# Patient Record
Sex: MALE | Race: WHITE | NOT HISPANIC OR LATINO | Employment: FULL TIME | URBAN - METROPOLITAN AREA
[De-identification: names, ages, dates, MRNs, and addresses within clinical notes are randomized per-mention and may not be internally consistent; named-entity substitution may affect disease eponyms.]

---

## 2021-07-08 ENCOUNTER — OFFICE VISIT (OUTPATIENT)
Dept: ORTHOPEDIC SURGERY | Facility: CLINIC | Age: 21
End: 2021-07-08

## 2021-07-08 VITALS
HEIGHT: 70 IN | HEART RATE: 59 BPM | WEIGHT: 224 LBS | DIASTOLIC BLOOD PRESSURE: 89 MMHG | SYSTOLIC BLOOD PRESSURE: 137 MMHG | BODY MASS INDEX: 32.07 KG/M2

## 2021-07-08 DIAGNOSIS — M25.311 INSTABILITY OF RIGHT SHOULDER JOINT: Primary | ICD-10-CM

## 2021-07-08 DIAGNOSIS — S43.491A BANKART LESION OF RIGHT SHOULDER, INITIAL ENCOUNTER: ICD-10-CM

## 2021-07-08 PROBLEM — S43.431A BANKART LESION OF RIGHT SHOULDER: Status: ACTIVE | Noted: 2021-07-08

## 2021-07-08 PROCEDURE — 99204 OFFICE O/P NEW MOD 45 MIN: CPT | Performed by: ORTHOPAEDIC SURGERY
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
## 2021-07-28 ENCOUNTER — TELEPHONE (OUTPATIENT)
Dept: ORTHOPEDIC SURGERY | Facility: CLINIC | Age: 21
End: 2021-07-28

## 2021-07-28 NOTE — TELEPHONE ENCOUNTER
I spoke with the patient and he would like letter faxed.  (number below) with restrictions.      Restrictions of no pushing, pulling, lifting or any overhead work at this time.  We will re-evaluate at future appointment.        Can you please type and fax letter.    Thanks,  Ragini Alston

## 2021-07-28 NOTE — TELEPHONE ENCOUNTER
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 PATIENT ADVISED HE NEEDS WORK RESTRICTIONS FAXED ASAP.

## 2021-07-28 NOTE — TELEPHONE ENCOUNTER
From Dr Wilcox:    Please check with the patient.  He is currently on full duty.  Does he feel that he could participate with the lifting pushing pulling necessary for his training.     If he does not feel that he is ready to do that we can keep him out of his training until he is cleared we would allow running jumping dynamic things but no lift push pull more than what ever weight he feels uncomfortable with.  Please check with the patient.         I called patient but no answer and no voicemail.    Ragini Alston

## 2022-10-08 ENCOUNTER — APPOINTMENT (OUTPATIENT)
Dept: GENERAL RADIOLOGY | Facility: HOSPITAL | Age: 22
End: 2022-10-08

## 2022-10-08 ENCOUNTER — HOSPITAL ENCOUNTER (EMERGENCY)
Facility: HOSPITAL | Age: 22
Discharge: HOME | End: 2022-10-08
Attending: EMERGENCY MEDICINE | Admitting: EMERGENCY MEDICINE

## 2022-10-08 VITALS
HEIGHT: 70 IN | OXYGEN SATURATION: 98 % | RESPIRATION RATE: 16 BRPM | WEIGHT: 230 LBS | DIASTOLIC BLOOD PRESSURE: 79 MMHG | HEART RATE: 76 BPM | TEMPERATURE: 98.3 F | SYSTOLIC BLOOD PRESSURE: 139 MMHG | BODY MASS INDEX: 32.93 KG/M2

## 2022-10-08 DIAGNOSIS — S90.31XA CONTUSION OF RIGHT FOOT, INITIAL ENCOUNTER: Primary | ICD-10-CM

## 2022-10-08 PROCEDURE — 73630 X-RAY EXAM OF FOOT: CPT

## 2022-10-08 PROCEDURE — 99282 EMERGENCY DEPT VISIT SF MDM: CPT

## 2022-10-09 NOTE — DISCHARGE INSTRUCTIONS
There was no obvious fracture on x-ray read, radiologist will look at this x-ray in the morning.  If there are any other findings, you will be contacted.  Rest, ice and elevate to help with pain and swelling.  Alternate ibuprofen and Tylenol that with pain.  Use walking boot and crutches to help rest the foot.  Follow-up with orthopedic surgery if pain persists or worsens.  Return to ER for any change, worsening symptoms, or any additional concerns.

## 2022-10-09 NOTE — ED PROVIDER NOTES
Subjective   History of Present Illness  Patient is a 22-year-old male with no reported past medical history presenting to the ER for evaluation of foot injury.  Patient states earlier this afternoon he was helping his father-in-law move some concrete patio furniture.  He states they are using a bobcat and it accidentally came down with the heavy load on top of his right foot.  He states he was wearing shoes at that time but has had some pain, bruising and swelling since the incident.  He states it was initially throbbing and he took some Advil.  He states he was resting but when he got up to put weight on the injury he had significant pain.  He states the pain is only present when he does bear weight to the foot.  He denies any other injury or symptoms.        Review of Systems   Constitutional: Negative for chills and fever.   HENT: Negative.    Eyes: Negative.    Respiratory: Negative.    Cardiovascular: Negative.    Gastrointestinal: Negative.    Endocrine: Negative.    Genitourinary: Negative.    Musculoskeletal: Positive for arthralgias and myalgias.   Skin: Positive for color change.   Allergic/Immunologic: Negative for immunocompromised state.   Neurological: Negative.    Psychiatric/Behavioral: Negative.        History reviewed. No pertinent past medical history.    No Known Allergies    Past Surgical History:   Procedure Laterality Date   • KNEE SURGERY Right     scope       Family History   Problem Relation Age of Onset   • No Known Problems Mother        Social History     Socioeconomic History   • Marital status: Single   Tobacco Use   • Smoking status: Never   • Smokeless tobacco: Never   Substance and Sexual Activity   • Alcohol use: Yes     Comment: occasional    • Drug use: Never   • Sexual activity: Defer           Objective   Physical Exam  Vitals and nursing note reviewed.     /79 (BP Location: Left arm, Patient Position: Sitting)   Pulse 76   Temp 98.3 °F (36.8 °C) (Oral)   Resp 16    "Ht 177.8 cm (70\")   Wt 104 kg (230 lb)   SpO2 98%   BMI 33.00 kg/m²     GEN: No acute distress, sitting upright in stretcher.  Awake and alert.  Does not appear septic or toxic.  Skin: Patient has a large area of ecchymosis to the dorsal aspect of right foot just distal to the ankle.  Head: Normocephalic, atraumatic  Eyes: EOM intact  ENT: Mask in place per  Cardiovascular: Regular rate  Lungs: Breathing even and nonlabored  Extremities: Patient has obvious ecchymosis, edema and tenderness to the dorsal aspect of right foot just distal to the ankle.  There is no tenderness over the medial or lateral malleolus.  Dorsalis pedis pulse is 2+  Neuro: GCS 15  Psych: Mood and affect are appropriate    Procedures           ED Course  ED Course as of 10/09/22 0032   Sat Oct 08, 2022   2045 Patient declined pain medicine at this time. [LA]   2138 Reviewed x-ray with Dr. Mejia, did not see any bony abnormality. [LA]   2138 Discussed findings with patient.  Discussed follow-up and strict return precautions.  He verbalized understanding and was in agreement with this plan of care [LA]      ED Course User Index  [LA] Sosa Garcia PA-C                                           MDM  Number of Diagnoses or Management Options  Contusion of right foot, initial encounter  Diagnosis management comments: On arrival, patient stable.  Differential could include crush injury, contusion, hematoma, fracture, and other concerns.  Patient declines medicine at this time.  Will obtain x-ray for further evaluation.    X-ray reviewed with attending, did not see any bony abnormality.  Will give patient walking boot and crutches.  Discussed follow-up, symptomatic treatment, strict return precautions.  He verbalized understanding and was in agreement with this plan of care       Amount and/or Complexity of Data Reviewed  Tests in the radiology section of CPT®: ordered and reviewed  Discussion of test results with the performing providers: " yes  Review and summarize past medical records: yes  Discuss the patient with other providers: yes    Risk of Complications, Morbidity, and/or Mortality  Presenting problems: low  Diagnostic procedures: low  Management options: low    Patient Progress  Patient progress: stable      Final diagnoses:   Contusion of right foot, initial encounter       ED Disposition  ED Disposition     ED Disposition   Discharge    Condition   Stable    Comment   --             Inderjit Steh MD  [address]  [facility] [address]  [facility] [address]  [phone]    Schedule an appointment as soon as possible for a visit   As needed, If symptoms worsen         Medication List      No changes were made to your prescriptions during this visit.          Sosa Garcia PA-C  10/09/22 0032

## 2024-02-20 ENCOUNTER — APPOINTMENT (OUTPATIENT)
Dept: CT IMAGING | Facility: HOSPITAL | Age: 24
End: 2024-02-20
Payer: COMMERCIAL

## 2024-02-20 ENCOUNTER — HOSPITAL ENCOUNTER (EMERGENCY)
Facility: HOSPITAL | Age: 24
Discharge: HOME | End: 2024-02-20
Attending: STUDENT IN AN ORGANIZED HEALTH CARE EDUCATION/TRAINING PROGRAM | Admitting: STUDENT IN AN ORGANIZED HEALTH CARE EDUCATION/TRAINING PROGRAM
Payer: COMMERCIAL

## 2024-02-20 VITALS
SYSTOLIC BLOOD PRESSURE: 123 MMHG | DIASTOLIC BLOOD PRESSURE: 83 MMHG | HEART RATE: 63 BPM | HEIGHT: 70 IN | WEIGHT: 220 LBS | BODY MASS INDEX: 31.5 KG/M2 | TEMPERATURE: 97.8 F | OXYGEN SATURATION: 100 % | RESPIRATION RATE: 14 BRPM

## 2024-02-20 DIAGNOSIS — N23 RENAL COLIC ON RIGHT SIDE: ICD-10-CM

## 2024-02-20 DIAGNOSIS — N20.0 KIDNEY STONE ON RIGHT SIDE: Primary | ICD-10-CM

## 2024-02-20 LAB
ALBUMIN SERPL-MCNC: 4.8 G/DL (ref 3.5–5.2)
ALBUMIN/GLOB SERPL: 1.7 G/DL
ALP SERPL-CCNC: 65 U/L (ref 39–117)
ALT SERPL W P-5'-P-CCNC: 103 U/L (ref 1–41)
ANION GAP SERPL CALCULATED.3IONS-SCNC: 15.9 MMOL/L (ref 5–15)
AST SERPL-CCNC: 63 U/L (ref 1–40)
BACTERIA UR QL AUTO: ABNORMAL /HPF
BILIRUB SERPL-MCNC: 0.6 MG/DL (ref 0–1.2)
BILIRUB UR QL STRIP: NEGATIVE
BUN SERPL-MCNC: 13 MG/DL (ref 6–20)
BUN/CREAT SERPL: 9.3 (ref 7–25)
CALCIUM SPEC-SCNC: 9.4 MG/DL (ref 8.6–10.5)
CHLORIDE SERPL-SCNC: 100 MMOL/L (ref 98–107)
CLARITY UR: CLEAR
CO2 SERPL-SCNC: 23.1 MMOL/L (ref 22–29)
COLOR UR: YELLOW
CREAT SERPL-MCNC: 1.4 MG/DL (ref 0.76–1.27)
DEPRECATED RDW RBC AUTO: 38.5 FL (ref 37–54)
EGFRCR SERPLBLD CKD-EPI 2021: 71.5 ML/MIN/1.73
EOSINOPHIL # BLD MANUAL: 0.43 10*3/MM3 (ref 0–0.4)
EOSINOPHIL NFR BLD MANUAL: 4 % (ref 0.3–6.2)
ERYTHROCYTE [DISTWIDTH] IN BLOOD BY AUTOMATED COUNT: 12.7 % (ref 12.3–15.4)
GLOBULIN UR ELPH-MCNC: 2.8 GM/DL
GLUCOSE SERPL-MCNC: 127 MG/DL (ref 65–99)
GLUCOSE UR STRIP-MCNC: NEGATIVE MG/DL
HCT VFR BLD AUTO: 44.7 % (ref 37.5–51)
HGB BLD-MCNC: 15.3 G/DL (ref 13–17.7)
HGB UR QL STRIP.AUTO: ABNORMAL
HYALINE CASTS UR QL AUTO: ABNORMAL /LPF
KETONES UR QL STRIP: ABNORMAL
LEUKOCYTE ESTERASE UR QL STRIP.AUTO: NEGATIVE
LIPASE SERPL-CCNC: 17 U/L (ref 13–60)
LYMPHOCYTES # BLD MANUAL: 4.82 10*3/MM3 (ref 0.7–3.1)
LYMPHOCYTES NFR BLD MANUAL: 9 % (ref 5–12)
MCH RBC QN AUTO: 28.9 PG (ref 26.6–33)
MCHC RBC AUTO-ENTMCNC: 34.2 G/DL (ref 31.5–35.7)
MCV RBC AUTO: 84.5 FL (ref 79–97)
MONOCYTES # BLD: 0.96 10*3/MM3 (ref 0.1–0.9)
NEUTROPHILS # BLD AUTO: 4.5 10*3/MM3 (ref 1.7–7)
NEUTROPHILS NFR BLD MANUAL: 42 % (ref 42.7–76)
NITRITE UR QL STRIP: NEGATIVE
PH UR STRIP.AUTO: 6.5 [PH] (ref 5–8)
PLATELET # BLD AUTO: 142 10*3/MM3 (ref 140–450)
PMV BLD AUTO: 12.7 FL (ref 6–12)
POTASSIUM SERPL-SCNC: 3.5 MMOL/L (ref 3.5–5.2)
PROT SERPL-MCNC: 7.6 G/DL (ref 6–8.5)
PROT UR QL STRIP: NEGATIVE
RBC # BLD AUTO: 5.29 10*6/MM3 (ref 4.14–5.8)
RBC # UR STRIP: ABNORMAL /HPF
RBC MORPH BLD: NORMAL
REF LAB TEST METHOD: ABNORMAL
SCAN SLIDE: NORMAL
SMALL PLATELETS BLD QL SMEAR: ADEQUATE
SODIUM SERPL-SCNC: 139 MMOL/L (ref 136–145)
SP GR UR STRIP: >1.03 (ref 1–1.03)
SQUAMOUS #/AREA URNS HPF: ABNORMAL /HPF
UROBILINOGEN UR QL STRIP: ABNORMAL
VARIANT LYMPHS NFR BLD MANUAL: 20 % (ref 0–5)
VARIANT LYMPHS NFR BLD MANUAL: 25 % (ref 19.6–45.3)
WBC # UR STRIP: ABNORMAL /HPF
WBC MORPH BLD: NORMAL
WBC NRBC COR # BLD AUTO: 10.71 10*3/MM3 (ref 3.4–10.8)

## 2024-02-20 PROCEDURE — 25510000001 IOPAMIDOL 61 % SOLUTION: Performed by: STUDENT IN AN ORGANIZED HEALTH CARE EDUCATION/TRAINING PROGRAM

## 2024-02-20 PROCEDURE — 99285 EMERGENCY DEPT VISIT HI MDM: CPT

## 2024-02-20 PROCEDURE — 83690 ASSAY OF LIPASE: CPT | Performed by: STUDENT IN AN ORGANIZED HEALTH CARE EDUCATION/TRAINING PROGRAM

## 2024-02-20 PROCEDURE — 25010000002 KETOROLAC TROMETHAMINE PER 15 MG: Performed by: STUDENT IN AN ORGANIZED HEALTH CARE EDUCATION/TRAINING PROGRAM

## 2024-02-20 PROCEDURE — 25810000003 SODIUM CHLORIDE 0.9 % SOLUTION: Performed by: STUDENT IN AN ORGANIZED HEALTH CARE EDUCATION/TRAINING PROGRAM

## 2024-02-20 PROCEDURE — 85025 COMPLETE CBC W/AUTO DIFF WBC: CPT | Performed by: STUDENT IN AN ORGANIZED HEALTH CARE EDUCATION/TRAINING PROGRAM

## 2024-02-20 PROCEDURE — 80053 COMPREHEN METABOLIC PANEL: CPT | Performed by: STUDENT IN AN ORGANIZED HEALTH CARE EDUCATION/TRAINING PROGRAM

## 2024-02-20 PROCEDURE — 81001 URINALYSIS AUTO W/SCOPE: CPT | Performed by: STUDENT IN AN ORGANIZED HEALTH CARE EDUCATION/TRAINING PROGRAM

## 2024-02-20 PROCEDURE — 74177 CT ABD & PELVIS W/CONTRAST: CPT

## 2024-02-20 PROCEDURE — 85007 BL SMEAR W/DIFF WBC COUNT: CPT | Performed by: STUDENT IN AN ORGANIZED HEALTH CARE EDUCATION/TRAINING PROGRAM

## 2024-02-20 PROCEDURE — 96374 THER/PROPH/DIAG INJ IV PUSH: CPT

## 2024-02-20 RX ORDER — KETOROLAC TROMETHAMINE 30 MG/ML
30 INJECTION, SOLUTION INTRAMUSCULAR; INTRAVENOUS ONCE
Status: COMPLETED | OUTPATIENT
Start: 2024-02-20 | End: 2024-02-20

## 2024-02-20 RX ORDER — TAMSULOSIN HYDROCHLORIDE 0.4 MG/1
1 CAPSULE ORAL DAILY
Qty: 5 CAPSULE | Refills: 0 | Status: SHIPPED | OUTPATIENT
Start: 2024-02-20

## 2024-02-20 RX ORDER — SODIUM CHLORIDE 0.9 % (FLUSH) 0.9 %
10 SYRINGE (ML) INJECTION AS NEEDED
Status: DISCONTINUED | OUTPATIENT
Start: 2024-02-20 | End: 2024-02-20 | Stop reason: HOSPADM

## 2024-02-20 RX ORDER — HYDROCODONE BITARTRATE AND ACETAMINOPHEN 5; 325 MG/1; MG/1
1 TABLET ORAL EVERY 6 HOURS PRN
Qty: 6 TABLET | Refills: 0 | Status: SHIPPED | OUTPATIENT
Start: 2024-02-20

## 2024-02-20 RX ADMIN — SODIUM CHLORIDE 1000 ML: 9 INJECTION, SOLUTION INTRAVENOUS at 00:41

## 2024-02-20 RX ADMIN — IOPAMIDOL 100 ML: 612 INJECTION, SOLUTION INTRAVENOUS at 01:03

## 2024-02-20 RX ADMIN — KETOROLAC TROMETHAMINE 30 MG: 30 INJECTION, SOLUTION INTRAMUSCULAR; INTRAVENOUS at 00:33

## 2024-02-20 NOTE — ED NOTES
No needs voiced at this time, pt sitting in bed speaking to wife, resp e/u, no s/s of distress, no needs voiced at this time.

## 2024-02-20 NOTE — ED NOTES
"Pt pulling off blood pressure cuff, SPO2 sensor and throwing them in to floor and hollering, \" someone please help me!\"   "

## 2024-02-20 NOTE — DISCHARGE INSTRUCTIONS
Continue Tylenol Motrin as needed for pain.  For pain that does not respond to these medications may take Norco as prescribed.  Follow-up with urology if stone does not pass on its own within the next 2 to 3 days.  Contact number provided to arrange appointment.

## 2024-02-20 NOTE — ED NOTES
"Went to vehicle due to family member coming up to window and stating pt was hurting too bad and needed help. At this time I acquired a wheelchair and proceeded to lobby as I entered lobby doors, the spouse screamed, \"put some fucking pep in your step! 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  "

## 2024-02-20 NOTE — Clinical Note
[facility] EMERGENCY DEPARTMENT  [address] [address]  Phone: [phone]    Umer Friedman was seen and treated in our emergency department on 2/20/2024.  He may return to work on 02/21/2024.         Thank you for choosing [facility].    Jorge Aguilar MD

## 2024-02-20 NOTE — Clinical Note
[facility] EMERGENCY DEPARTMENT  [address] [address]  Phone: [phone]    Myra Friedman accompanied Umer Friedman to the emergency department on 2/20/2024. They may return to work on 02/21/2024.        Thank you for choosing [facility].    Jorge Aguilar MD

## 2024-02-20 NOTE — ED PROVIDER NOTES
EMERGENCY DEPARTMENT ENCOUNTER    Pt Name: Umer Friedman  MRN: [id number]  Pt :   2000  Room Number:    Date of encounter:  2024  PCP: Karoline Zamudio  ED Provider: Jorge Aguilar MD    Historian: Patient      HPI:  Chief Complaint: Abdominal pain        Context: Umer Friedman is a 23 y.o. male who presents to the ED c/o abdominal pain.  Patient states that he woke up from sleep with sudden onset of severe need to urinate but inability to urinate.  Also reports right flank pain and right lower abdominal pain.  Went to bed feeling normal and had urinated prior to going to bed.  Does report that he was having sex with his wife tonight and felt what he thought was a cramp in his right side at that time, but no significant pain.      PAST MEDICAL HISTORY  History reviewed. No pertinent past medical history.      PAST SURGICAL HISTORY  Past Surgical History:   Procedure Laterality Date    KNEE SURGERY Right     scope         FAMILY HISTORY  Family History   Problem Relation Age of Onset    No Known Problems Mother          SOCIAL HISTORY  Social History     Socioeconomic History    Marital status: Single   Tobacco Use    Smoking status: Never    Smokeless tobacco: Never   Substance and Sexual Activity    Alcohol use: Yes     Comment: occasional     Drug use: Never    Sexual activity: Defer         ALLERGIES  Patient has no known allergies.        REVIEW OF SYSTEMS  Review of Systems     All systems reviewed and negative except for those discussed in HPI.       PHYSICAL EXAM    I have reviewed the triage vital signs and nursing notes.    ED Triage Vitals [24 0020]   Temp Heart Rate Resp BP SpO2   97.6 °F (36.4 °C) 55 20 -- 99 %      Temp src Heart Rate Source Patient Position BP Location FiO2 (%)   Oral Monitor Lying Left arm --       Physical Exam    General:  Awake, alert, no acute distress  HEENT: Atraumatic, normocephalic, EOMI, PERRLA, mucous membranes moist  NECK:  Supple,  atraumatic  Cardiovascular:  Regular rate, regular rhythm, no murmurs, rubs, or gallops.  Extremities well perfused   Respiratory:  Regular rate, clear lungs to auscultation bilaterally.  No rhonchi, rales, wheezing  Abdominal:  Soft, nondistended, mild right lower quadrant tenderness to palpation, positive right CVA tenderness.  No guarding or rebound.  No palpable masses  Genitourinary: Normal external genitalia inspection with no blood at urethral meatus no visible or palpable hernias bilaterally..   Extremity:  No visible bony abnormalities in all 4 extremities.  Full range of motion of all extremities.  Skin:  Warm and dry.  No rashes  Neuro:  AAOx3, GCS 15. Cranial nerves 2-12 grossly intact.  No focal strength or sensation deficits.  Psych:  Mood and affect appropriate.        LAB RESULTS  Recent Results (from the past 24 hour(s))   Lipase    Collection Time: 02/20/24 12:30 AM    Specimen: Blood   Result Value Ref Range    Lipase 17 13 - 60 U/L   Comprehensive Metabolic Panel    Collection Time: 02/20/24 12:30 AM    Specimen: Blood   Result Value Ref Range    Glucose 127 (H) 65 - 99 mg/dL    BUN 13 6 - 20 mg/dL    Creatinine 1.40 (H) 0.76 - 1.27 mg/dL    Sodium 139 136 - 145 mmol/L    Potassium 3.5 3.5 - 5.2 mmol/L    Chloride 100 98 - 107 mmol/L    CO2 23.1 22.0 - 29.0 mmol/L    Calcium 9.4 8.6 - 10.5 mg/dL    Total Protein 7.6 6.0 - 8.5 g/dL    Albumin 4.8 3.5 - 5.2 g/dL    ALT (SGPT) 103 (H) 1 - 41 U/L    AST (SGOT) 63 (H) 1 - 40 U/L    Alkaline Phosphatase 65 39 - 117 U/L    Total Bilirubin 0.6 0.0 - 1.2 mg/dL    Globulin 2.8 gm/dL    A/G Ratio 1.7 g/dL    BUN/Creatinine Ratio 9.3 7.0 - 25.0    Anion Gap 15.9 (H) 5.0 - 15.0 mmol/L    eGFR 71.5 >60.0 mL/min/1.73   CBC Auto Differential    Collection Time: 02/20/24 12:30 AM    Specimen: Blood   Result Value Ref Range    WBC 10.71 3.40 - 10.80 10*3/mm3    RBC 5.29 4.14 - 5.80 10*6/mm3    Hemoglobin 15.3 13.0 - 17.7 g/dL    Hematocrit 44.7 37.5 - 51.0 %     MCV 84.5 79.0 - 97.0 fL    MCH 28.9 26.6 - 33.0 pg    MCHC 34.2 31.5 - 35.7 g/dL    RDW 12.7 12.3 - 15.4 %    RDW-SD 38.5 37.0 - 54.0 fl    MPV 12.7 (H) 6.0 - 12.0 fL    Platelets 142 140 - 450 10*3/mm3   Scan Slide    Collection Time: 02/20/24 12:30 AM    Specimen: Blood   Result Value Ref Range    Scan Slide     Manual Differential    Collection Time: 02/20/24 12:30 AM    Specimen: Blood   Result Value Ref Range    Neutrophil % 42.0 (L) 42.7 - 76.0 %    Lymphocyte % 25.0 19.6 - 45.3 %    Monocyte % 9.0 5.0 - 12.0 %    Eosinophil % 4.0 0.3 - 6.2 %    Atypical Lymphocyte % 20.0 (H) 0.0 - 5.0 %    Neutrophils Absolute 4.50 1.70 - 7.00 10*3/mm3    Lymphocytes Absolute 4.82 (H) 0.70 - 3.10 10*3/mm3    Monocytes Absolute 0.96 (H) 0.10 - 0.90 10*3/mm3    Eosinophils Absolute 0.43 (H) 0.00 - 0.40 10*3/mm3    RBC Morphology Normal Normal    WBC Morphology Normal Normal    Platelet Estimate Adequate Normal   Urinalysis With Microscopic If Indicated (No Culture) - Urine, Clean Catch    Collection Time: 02/20/24  1:21 AM    Specimen: Urine, Clean Catch   Result Value Ref Range    Color, UA Yellow Yellow, Straw    Appearance, UA Clear Clear    pH, UA 6.5 5.0 - 8.0    Specific Gravity, UA >1.030 (H) 1.005 - 1.030    Glucose, UA Negative Negative    Ketones, UA 15 mg/dL (1+) (A) Negative    Bilirubin, UA Negative Negative    Blood, UA Moderate (2+) (A) Negative    Protein, UA Negative Negative    Leuk Esterase, UA Negative Negative    Nitrite, UA Negative Negative    Urobilinogen, UA 0.2 E.U./dL 0.2 - 1.0 E.U./dL   Urinalysis, Microscopic Only - Urine, Clean Catch    Collection Time: 02/20/24  1:21 AM    Specimen: Urine, Clean Catch   Result Value Ref Range    RBC, UA 11-20 (A) None Seen, 0-2 /HPF    WBC, UA None Seen None Seen, 0-2 /HPF    Bacteria, UA None Seen None Seen /HPF    Squamous Epithelial Cells, UA 0-2 None Seen, 0-2 /HPF    Hyaline Casts, UA None Seen None Seen /LPF    Methodology Manual Light Microscopy        If  labs were ordered, I independently reviewed the results and considered them in treating the patient.        RADIOLOGY  CT Abdomen Pelvis With Contrast    Result Date: 2/20/2024  FINAL REPORT TECHNIQUE: null CLINICAL HISTORY: Right-sided abdominal pain COMPARISON: null FINDINGS: CT Abdomen and Pelvis W Contrast COMPARISON: None FINDINGS: Diffusely hypodense liver consistent with hepatic steatosis. Hepatomegaly. Normal spleen. Distal right ureteral 2 mm calculus at the right ureterovesicular junction (series 2, image 85). Mild right hydronephrosis and right ureterectasis. Unremarkable left kidney. Normal adrenal glands. Normal pancreas. No visible gallstones. No biliary dilation. No evidence of bowel obstruction or colitis. Normal appendix. Unremarkable bladder. No ascites. No pneumoperitoneum. No lymphadenopathy. No acute fracture. No abdominal aortic aneurysm.     IMPRESSION: Distal right ureteral calculus. Mild right hydronephrosis and right ureterectasis. Nonemergent/incidental findings above. Authenticated and Electronically Signed by Isaiah Fontanez MD on 02/20/2024 02:41:38 AM     I ordered and independently reviewed the above noted radiographic studies.     See radiologist's dictation for official interpretation.        PROCEDURES    Procedures    No orders to display       MEDICATIONS GIVEN IN ER    Medications   sodium chloride 0.9 % flush 10 mL (has no administration in time range)   ketorolac (TORADOL) injection 30 mg (30 mg Intravenous Given 2/20/24 0033)   sodium chloride 0.9 % bolus 1,000 mL (0 mL Intravenous Stopped 2/20/24 0220)   iopamidol (ISOVUE-300) 61 % injection 100 mL (100 mL Intravenous Given 2/20/24 0103)         MEDICAL DECISION MAKING, PROGRESS, and CONSULTS    All labs, if obtained, have been independently reviewed by me.  All radiology studies, if obtained, have been reviewed by me and the radiologist dictating the report.  All EKG's, if obtained, have been independently viewed and  interpreted by me.      Discussion below represents my analysis of pertinent findings related to patient's condition, differential diagnosis, treatment plan and final disposition.    Umer Friedman is a 23 y.o. male who presents to the ED c/o abdominal pain.  Hemodynamically stable but in visible discomfort on arrival.  Differential includes was not limited to acute urinary retention, kidney stone, appendicitis, UTI, pyelonephritis, urethral injury.  Patient given Toradol for pain IV 30 mg.  Labs and urinalysis ordered.  Bladder scan obtained by nursing that showed  Labs show borderline leukocytosis.  Mildly elevated creatinine of 1.4.  Urinalysis shows blood but no secondary evidence of infection.    CT of abdomen pelvis obtained which showed evidence of 2 mm distal right ureter calculus with mild right-sided hydro.  Pain well-controlled on reexamination.  Provided with short course prescription for hydrocodone along with urology referral if stone does not pass on its own within the next 2 to 3 days.  Patient agreeable with this plan and was discharged home with his wife.                                 Orders placed during this visit:  Orders Placed This Encounter   Procedures    CT Abdomen Pelvis With Contrast    Urinalysis With Microscopic If Indicated (No Culture) - Urine, Clean Catch    Lipase    Comprehensive Metabolic Panel    CBC Auto Differential    Scan Slide    Manual Differential    Urinalysis, Microscopic Only - Urine, Clean Catch    Insert Peripheral IV    CBC & Differential         ED Course:    Consultants:                  Shared Decision Making:  After my consideration of clinical presentation and any laboratory/radiology studies obtained, I discussed the findings with the patient/patient representative who is in agreement with the treatment plan and the final disposition.   Risks and benefits of discharge and/or observation/admission were discussed.      AS OF 03:18 EST VITALS:    BP - 123/83  HR -  63  TEMP - 97.8 °F (36.6 °C) (Oral)  O2 SATS - 100%                  DIAGNOSIS  Final diagnoses:   Kidney stone on right side   Renal colic on right side         DISPOSITION  Discharge      Please note that portions of this document were completed with voice recognition software.        Jorge Aguilar MD  02/20/24 2752

## 2024-07-30 ENCOUNTER — OFFICE VISIT (OUTPATIENT)
Dept: PSYCHIATRY | Facility: CLINIC | Age: 24
End: 2024-07-30
Payer: COMMERCIAL

## 2024-07-30 VITALS
HEART RATE: 72 BPM | BODY MASS INDEX: 33.79 KG/M2 | OXYGEN SATURATION: 99 % | SYSTOLIC BLOOD PRESSURE: 114 MMHG | WEIGHT: 236 LBS | DIASTOLIC BLOOD PRESSURE: 78 MMHG | HEIGHT: 70 IN

## 2024-07-30 DIAGNOSIS — Z79.899 MEDICATION MANAGEMENT: ICD-10-CM

## 2024-07-30 DIAGNOSIS — Z13.39 ATTENTION DEFICIT HYPERACTIVITY DISORDER EVALUATION: ICD-10-CM

## 2024-07-30 DIAGNOSIS — F90.0 ATTENTION DEFICIT HYPERACTIVITY DISORDER (ADHD), PREDOMINANTLY INATTENTIVE TYPE: Primary | ICD-10-CM

## 2024-07-30 PROBLEM — M25.311 INSTABILITY OF RIGHT SHOULDER JOINT: Status: ACTIVE | Noted: 2023-01-19

## 2024-07-30 PROCEDURE — 90792 PSYCH DIAG EVAL W/MED SRVCS: CPT | Performed by: REGISTERED NURSE

## 2024-07-30 RX ORDER — DEXTROAMPHETAMINE SACCHARATE, AMPHETAMINE ASPARTATE MONOHYDRATE, DEXTROAMPHETAMINE SULFATE AND AMPHETAMINE SULFATE 3.75; 3.75; 3.75; 3.75 MG/1; MG/1; MG/1; MG/1
15 CAPSULE, EXTENDED RELEASE ORAL DAILY
Qty: 30 CAPSULE | Refills: 0 | Status: SHIPPED | OUTPATIENT
Start: 2024-07-30 | End: 2024-08-29

## 2024-07-30 NOTE — PROGRESS NOTES
New Patient Office Visit      Patient Name: Umer Friedman  : 2000   MRN: [id number]     Referring Provider: Karoline Zamudio    Chief Complaint:      ICD-10-CM ICD-9-CM   1. Attention deficit hyperactivity disorder (ADHD), predominantly inattentive type  F90.0 314.00   2. Attention deficit hyperactivity disorder evaluation  Z13.39 V79.8   3. Medication management  Z79.899 V58.69        History of Present Illness:   Umer Friedman is a 24 y.o. male who is here today for initial evaluation and to establish care for decreased concentration, decreased focus, and hard to complete daily tasks.  He states he is very forgetful and forgets things everywhere he has been.  He states as a child he was diagnosed with attention deficit disorder.  He states that he had tried a few different medications as a child but does not remember the name of them.  He states the one that worked the most was the medicine he took in the morning and he also took breaks from it on the weekend or when school was on break.  Instructed that he was probably on a stimulant and ask him if he was on Adderall.  He states that this sounds familiar.  Patient is currently a  and has been for the last 6 years.  He states while he is doing hands on task he does not have any problem but as soon as he does any sit down task he has a very difficult time to focus and concentrate on what he is supposed to be doing.  He currently enrolled in college again at [employer].  He is going for a bachelor's in occupational safety.  Patient is also in the [employer].  He states he went off his ADD medication prior to going into the [employer] at the age of 15 so that he would be accepted into the .  However, at this time they are okay with him being medicated for his ADD symptoms.  He currently denies any history or recent SI/SA/AVH.  He reports some trauma from work events.  He has previously participated in  therapy but is not interested at this time.  He is concerned about going back to school and doing task at work due to his lack of focus and attention.  He will be scheduled for the CPT 3 exam.  After his assessment we discussed medication options for his ADD.  It was decided to start patient on Adderall XR 15 mg daily in the morning.  He was instructed that he could continue to take medication breaks if needed but that most people do better taking it daily.  Patient verbalized understanding.  Printed out information on the medication and give it to patient.  Instructed on mechanism of action and side effects and when to seek medical care.  Patient verbalized understanding.  Will follow-up in 4 weeks for medication and symptom management.    Therapy: referred to therapy but is not interested at this time.    Subjective      Review of Systems:   Review of Systems   Constitutional:  Negative for activity change, fatigue, unexpected weight gain and unexpected weight loss.   HENT:  Negative for drooling, hearing loss, swollen glands and trouble swallowing.    Eyes:  Negative for blurred vision, double vision and visual disturbance.   Respiratory:  Negative for apnea, cough, chest tightness and shortness of breath.    Cardiovascular:  Negative for chest pain, palpitations and leg swelling.   Gastrointestinal:  Negative for abdominal distention, abdominal pain, constipation, diarrhea, nausea, vomiting, GERD and indigestion.   Endocrine: Negative for cold intolerance, heat intolerance and polyuria.   Genitourinary:  Negative for breast discharge and decreased libido.   Musculoskeletal:  Negative for arthralgias, back pain, myalgias, neck pain and neck stiffness.   Skin:  Negative for rash.   Allergic/Immunologic: Negative for immunocompromised state.   Neurological:  Negative for dizziness, tremors, seizures, syncope, facial asymmetry, speech difficulty, weakness, light-headedness, numbness, headache, memory problem and  confusion.   Psychiatric/Behavioral:  Positive for decreased concentration and sleep disturbance. Negative for agitation, behavioral problems, dysphoric mood, hallucinations, self-injury, suicidal ideas, negative for hyperactivity, depressed mood and stress. The patient is nervous/anxious.        Past Medical History:   History reviewed. No pertinent past medical history.    Past Surgical History:   Past Surgical History:   Procedure Laterality Date    KNEE SURGERY Right     scope       Family History:   Family History   Problem Relation Age of Onset    No Known Problems Mother        Family Psychiatric History:  denies    Screening Scores:   PHQ-9 Total Score: 8  SHAMEKA-7  Feeling nervous, anxious or on edge: Several days  Not being able to stop or control worrying: Several days  Worrying too much about different things: Several days  Trouble Relaxing: Several days  Being so restless that it is hard to sit still: Not at all  Feeling afraid as if something awful might happen: Not at all  Becoming easily annoyed or irritable: Several days  SHAMEKA 7 Total Score: 5  If you checked any problems, how difficult have these problems made it for you to do your work, take care of things at home, or get along with other people: Somewhat difficult    Psychiatric History:   Previous medications tried: Zoloft, Adderall  Inpatient admissions: Denies  History of suicide/self-harm attempts: Denies  Family history of suicide or attempts: Denies  Trauma/Abuse History: Trauma from work events  Developmental History: Normal    RISK ASSESSMENT:  Does patient currently have intent, plan, ideation for suicide?  Denies  Access to firearms or weapons: Denies  History of homicidal ideation: Denies  Risk Taking/Impulsive Behavior (current or past): None describe: None  Protective factors: Wife and children    Social History:  Highest level of education obtained: College  Living situation: Lives with wife and children  Patient's Occupation:  " and the [employer]  Leisure and Recreation: Hunting, fishing, family, socializing  Support system: Wife and coworkers  Illicit substance use: Denies  Alcohol use: Socially  Tobacco use: Denies    Legal History:   None    Medications:     Current Outpatient Medications:     amphetamine-dextroamphetamine XR (Adderall XR) 15 MG 24 hr capsule, Take 1 capsule by mouth Daily for 30 days, Disp: 30 capsule, Rfl: 0    HYDROcodone-acetaminophen (NORCO) 5-325 MG per tablet, Take 1 tablet by mouth Every 6 (Six) Hours As Needed for Severe Pain., Disp: 6 tablet, Rfl: 0    tamsulosin (FLOMAX) 0.4 MG capsule 24 hr capsule, Take 1 capsule by mouth Daily., Disp: 5 capsule, Rfl: 0    Medication Considerations:  MITCH reviewed and appropriate.      Allergies:   No Known Allergies    Objective     Physical Exam:  Vital Signs:   Vitals:    07/30/24 0901   BP: 114/78   Pulse: 72   SpO2: 99%   Weight: 107 kg (236 lb)   Height: 177.8 cm (70\")     Body mass index is 33.86 kg/m².     Mental Status Exam:   MENTAL STATUS EXAM   General Appearance:  Cleanly groomed and dressed  Eye Contact:  Good eye contact  Attitude:  Cooperative  Motor Activity:  Fidgety  Muscle Strength:  Normal  Speech:  Hyper talkative  Language:  Spontaneous  Mood and affect:  Normal, pleasant  Hopelessness:  Denies  Loneliness: Denies  Thought Process:  Logical  Associations/ Thought Content:  No delusions  Hallucinations:  None  Suicidal Ideations:  Not present  Homicidal Ideation:  Not present  Sensorium:  Alert  Orientation:  Person, place, time and situation  Immediate Recall, Recent, and Remote Memory:  Intact  Attention Span/ Concentration:  Easily distracted  Fund of Knowledge:  Appropriate for age and educational level  Intellectual Functioning:  Average range  Insight:  Good  Judgement:  Fair  Reliability:  Good  Impulse Control:  Good       Assessment / Plan      Visit Diagnosis/Orders Placed This Visit:  Diagnoses and all orders for " this visit:    1. Attention deficit hyperactivity disorder (ADHD), predominantly inattentive type (Primary)  -     amphetamine-dextroamphetamine XR (Adderall XR) 15 MG 24 hr capsule; Take 1 capsule by mouth Daily for 30 days  Dispense: 30 capsule; Refill: 0  -     Compliance Drug Analysis, Ur - Urine, Clean Catch    2. Attention deficit hyperactivity disorder evaluation  -     Compliance Drug Analysis, Ur - Urine, Clean Catch    3. Medication management  -     Compliance Drug Analysis, Ur - Urine, Clean Catch         Functional Status: No impairment    Prognosis: Good with ongoing treatment    Impression/Formulation:  Patient appeared alert and oriented.  Patient is voluntarily requesting to begin psychiatric medication management at [facility].  Patient is receptive to assistance with maintaining a stable lifestyle.  Patient presents with history of     ICD-10-CM ICD-9-CM   1. Attention deficit hyperactivity disorder (ADHD), predominantly inattentive type  F90.0 314.00   2. Attention deficit hyperactivity disorder evaluation  Z13.39 V79.8   3. Medication management  Z79.899 V58.69   .     Treatment Plan:   -Scheduled CPT 3 exam to assess his functioning with concentration and focus  - Prescribed Adderall XR 15 mg daily for ADD symptoms  - Follow up in 4 weeks      The MITCH report, reviewed through PDMP, of the past 12 months were reviewed and is appropriate.  The patient/guardian reports taking the medication only as prescribed.  The patient/guardian denies any abuse or misuse of the medication.  The patient/guardian denies any other substance use or issues.  There are no apparent substance related issues.  The patient reports no side effects of the current medication usage.  The patient/guardian has reported significant improvement with medication usage and wishes to continue medication as prescribed.  The patient/guardian is appropriate to continue with current medication usage at this  time.  Reinforced risks and side effects of medication usage, patient and/or guardian verbalize understanding in their own words and are in agreement with current plan.    The patient is being prescribed a controlled substance as part of the treatment plan. The patient/guardian has been educated of appropriate use of the medication(s), including risks and side effects such as insomnia, headache, exacerbation of tics, nervousness, irritability, overstimulation, tremor, dizziness, anorexia or change in appetite, nausea, dry mouth, constipation, diarrhea, weight loss, sexual dysfunction, psychotic episodes, seizures, palpitations, tachycardia, hypertension, rare activation (activation of hypomania, kimberly, and/or suicidal ideations), cardiovascular adverse effects including sudden death (especially patients with pre-existing structural abnormalities often associated with a family history of cardiac disease), may slow normal growth in children, weight gain is reported but not expected, sedation is possible but activation is much more common, metabolic adversities, and overdose among others. Patient/guardian is also informed that the medication is to be used by the patient only, the medication is to be used only as directed, and the medication should not be combined with other substances unless directed by a Provider/Prescriber. The patient/guardian verbalized understanding and agreement with this in their own words, and wish to continue with current treatment plan.    GOALS:  Short Term Goals: Patient will be compliant with medication, and patient will have no significant medication related side effects.  Patient will be engaged in psychotherapy as indicated.  Patient will report subjective improvement of symptoms.  Long term goals: To stabilize mood and treat/improve subjective symptoms, the patient will stay out of the hospital, the patient will be at an optimal level of functioning, and the patient will take all  medications as prescribed.  The patient/guardian verbalized understanding and agreement with goals that were mutually set.     Patient will continue supportive psychotherapy efforts and medications as indicated. Clinic will obtain release of information for current treatment team for continuity of care as needed. Patient will contact this office, call 911 or present to the nearest emergency room should suicidal or homicidal ideations occur. Discussed medication options and treatment plan of prescribed medication(s) as well as the risks, benefits, and potential side effects. Patient acknowledged and verbally consented to continue with current treatment plan and was educated on the importance of compliance with treatment and follow-up appointments.     Follow Up:   Return in about 4 weeks (around 8/27/2024) for Med Check.        OCTAVIO Osorio  [facility]     This is electronically signed by OCTAVIO Osorio  [unfilled] 11:40 EDT

## 2024-08-07 LAB — DRUGS UR: NORMAL

## 2024-09-11 ENCOUNTER — TELEMEDICINE (OUTPATIENT)
Dept: PSYCHIATRY | Facility: CLINIC | Age: 24
End: 2024-09-11
Payer: COMMERCIAL

## 2024-09-11 DIAGNOSIS — F90.0 ATTENTION DEFICIT HYPERACTIVITY DISORDER (ADHD), PREDOMINANTLY INATTENTIVE TYPE: Primary | ICD-10-CM

## 2024-09-11 PROCEDURE — 99214 OFFICE O/P EST MOD 30 MIN: CPT | Performed by: REGISTERED NURSE

## 2024-09-11 RX ORDER — DEXTROAMPHETAMINE SACCHARATE, AMPHETAMINE ASPARTATE, DEXTROAMPHETAMINE SULFATE AND AMPHETAMINE SULFATE 3.75; 3.75; 3.75; 3.75 MG/1; MG/1; MG/1; MG/1
15 TABLET ORAL 2 TIMES DAILY
Qty: 60 TABLET | Refills: 0 | Status: SHIPPED | OUTPATIENT
Start: 2024-09-11 | End: 2024-10-11

## 2024-09-11 NOTE — PROGRESS NOTES
"     Video Visit      Patient Name: Umer Friedman  : 2000   MRN: [id number]     Referring Provider: Karoline Zamudio    Chief Complaint:      ICD-10-CM ICD-9-CM   1. Attention deficit hyperactivity disorder (ADHD), predominantly inattentive type  F90.0 314.00        This provider is located at the [facility] (through [facility]), [address], [address], [place] [address] using a secure [employer] Video Visit through [employer]. Patient is being seen remotely via telehealth video visit at their home address in [place], and stated they are in a secure environment for this session. The patient's condition being diagnosed/treated is appropriate for telemedicine. The provider identified herself as well as her credentials. The patient, and/or patients guardian, consent to be seen remotely, and when consent is given they understand that the consent allows for patient identifiable information to be sent to a third party as needed. They may refuse to be seen remotely at any time. The electronic data is encrypted and password protected, and the patient and/or guardian has been advised of the potential risks to privacy not withstanding such measures.    The patient has chosen to receive care today through a telehealth video visit. Do you consent to use a video/audio connection for your medical care today? Yes    History of Present Illness:   Umer Friedman is a 24 y.o. male who is here today for follow up for medication and symptom management.  On patient's last visit he was diagnosed with ADHD, predominantly inattentive type and started on Adderall XR 15 mg daily for his ADHD symptoms.  Patient is currently a  and going to college.  He states that he is doing okay and not \"failing\" in school.  However, he states that he has not felt any difference taking the medication versus not taking it possibly due to it being a low starter dose.  Initially patient only wanted " to take medication 1 time a day due to his work schedule.  Patient also states he does not take his Adderall every day only if he is working or working on school.  He was instructed that the medication may be more beneficial if he takes it every day as scheduled. We discussed the benefits of taking Adderall once a day versus twice a day and what could be more beneficial for patient.  After discussing further medication and treatment options, his Adderall was switched to Adderall immediate release 15 mg twice daily in hopes to better control his symptoms and help him be more productive at work and with school.  Patient was instructed to take his second dose of Adderall around 3 or 4 in the afternoon and not to take too close to bedtime because it could cause insomnia.  Patient is aware of this and stated 1 day he took his Adderall XR at 3 PM and was up all night.  Patient again was instructed on the mechanism of action and side effects of this medication when to seek medical treatment.  Patient verbalized understanding and stated that he has not had any increase in his heart rate or blood pressure or any appetite changes so far on this medication.  He was instructed to continue to monitor for this and to notify this provider if any changes are noted.  Patient denies any current or recent SI/HI/AVH.  We will follow up with patient in 8 weeks for medication and symptom management or sooner if needed.    Subjective      Review of Systems:   Review of Systems   Psychiatric/Behavioral:  Positive for decreased concentration.        Screening Scores:   PHQ-9 Total Score: (P) 0  SHAMEKA-7  Feeling nervous, anxious or on edge: (P) Not at all  Not being able to stop or control worrying: (P) Not at all  Worrying too much about different things: (P) Not at all  Trouble Relaxing: (P) Not at all  Being so restless that it is hard to sit still: (P) Not at all  Feeling afraid as if something awful might happen: (P) Not at all  Becoming  easily annoyed or irritable: (P) Not at all  SHAMEKA 7 Total Score: (P) 0  If you checked any problems, how difficult have these problems made it for you to do your work, take care of things at home, or get along with other people: (P) Not difficult at all      RISK ASSESSMENT:  Patient denies any thoughts of suicide or intent today. Patient denies any suicidal or homicidal ideation today. Patient denies any high risk factors today.     Medications:     Current Outpatient Medications:     amphetamine-dextroamphetamine (Adderall) 15 MG tablet, Take 1 tablet by mouth 2 (Two) Times a Day for 30 days., Disp: 60 tablet, Rfl: 0    HYDROcodone-acetaminophen (NORCO) 5-325 MG per tablet, Take 1 tablet by mouth Every 6 (Six) Hours As Needed for Severe Pain., Disp: 6 tablet, Rfl: 0    tamsulosin (FLOMAX) 0.4 MG capsule 24 hr capsule, Take 1 capsule by mouth Daily., Disp: 5 capsule, Rfl: 0    Medication Considerations:  MITCH reviewed and appropriate.      Allergies:   No Known Allergies    Objective     Physical Exam:  Vital Signs: There were no vitals filed for this visit.  There is no height or weight on file to calculate BMI.     Mental Status Exam:   MENTAL STATUS EXAM   General Appearance:  Cleanly groomed and dressed  Eye Contact:  Good eye contact  Attitude:  Cooperative  Speech:  Normal rate, tone, volume  Language:  Spontaneous  Mood and affect:  Normal, pleasant  Hopelessness:  Denies  Loneliness: Denies  Thought Process:  Logical  Associations/ Thought Content:  No delusions  Suicidal Ideations:  Not present  Homicidal Ideation:  Not present  Sensorium:  Alert  Orientation:  Person, place, time and situation  Immediate Recall, Recent, and Remote Memory:  Intact  Attention Span/ Concentration:  Easily distracted  Fund of Knowledge:  Appropriate for age and educational level  Intellectual Functioning:  Average range  Insight:  Good  Judgement:  Good  Reliability:  Good  Impulse Control:  Good         Assessment / Plan       Visit Diagnosis/Orders Placed This Visit:  Diagnoses and all orders for this visit:    1. Attention deficit hyperactivity disorder (ADHD), predominantly inattentive type (Primary)  -     amphetamine-dextroamphetamine (Adderall) 15 MG tablet; Take 1 tablet by mouth 2 (Two) Times a Day for 30 days.  Dispense: 60 tablet; Refill: 0         Functional Status: Mild impairment    Prognosis: Good with ongoing treatment    Impression/Formulation:  Patient appeared alert and oriented.  Patient is voluntarily requesting to begin outpatient therapy at [facility].  Patient is receptive to assistance with maintaining a stable lifestyle.  Patient presents with history of     ICD-10-CM ICD-9-CM   1. Attention deficit hyperactivity disorder (ADHD), predominantly inattentive type  F90.0 314.00   .     Treatment Plan:   -Discontinue Adderall XR 15 mg daily  -Prescribed Adderall IR 15 mg twice daily for his ADHD symptoms  -Follow up in 8 weeks      The MITCH report, reviewed through PDMP, of the past 12 months were reviewed and is appropriate.  The patient/guardian reports taking the medication only as prescribed.  The patient/guardian denies any abuse or misuse of the medication.  The patient/guardian denies any other substance use or issues.  There are no apparent substance related issues.  The patient reports no side effects of the current medication usage.  The patient/guardian has reported significant improvement with medication usage and wishes to continue medication as prescribed.  The patient/guardian is appropriate to continue with current medication usage at this time.  Reinforced risks and side effects of medication usage, patient and/or guardian verbalize understanding in their own words and are in agreement with current plan.    The patient is being prescribed a controlled substance as part of the treatment plan. The patient/guardian has been educated of appropriate use of the medication(s),  including risks and side effects such as insomnia, headache, exacerbation of tics, nervousness, irritability, overstimulation, tremor, dizziness, anorexia or change in appetite, nausea, dry mouth, constipation, diarrhea, weight loss, sexual dysfunction, psychotic episodes, seizures, palpitations, tachycardia, hypertension, rare activation (activation of hypomania, kimberly, and/or suicidal ideations), cardiovascular adverse effects including sudden death (especially patients with pre-existing structural abnormalities often associated with a family history of cardiac disease), may slow normal growth in children, weight gain is reported but not expected, sedation is possible but activation is much more common, metabolic adversities, and overdose among others. Patient/guardian is also informed that the medication is to be used by the patient only, the medication is to be used only as directed, and the medication should not be combined with other substances unless directed by a Provider/Prescriber. The patient/guardian verbalized understanding and agreement with this in their own words, and wish to continue with current treatment plan.    GOALS:  Short Term Goals: Patient will be compliant with medication, and patient will have no significant medication related side effects.  Patient will be engaged in psychotherapy as indicated.  Patient will report subjective improvement of symptoms.  Long term goals: To stabilize mood and treat/improve subjective symptoms, the patient will stay out of the hospital, the patient will be at an optimal level of functioning, and the patient will take all medications as prescribed.  The patient/guardian verbalized understanding and agreement with goals that were mutually set.     Patient will continue supportive psychotherapy efforts and medications as indicated. Clinic will obtain release of information for current treatment team for continuity of care as needed. Patient will contact this  office, call 911 or present to the nearest emergency room should suicidal or homicidal ideations occur. Discussed medication options and treatment plan of prescribed medication(s) as well as the risks, benefits, and potential side effects. Patient ackowledged and verbally consented to continue with current treatment plan and was educated on the importance of compliance with treatment and follow-up appointments.     Follow Up:   Return in about 8 years (around 9/11/2032) for Med Check, Video visit.      OCTAVIO Osorio  [facility]     This is electronically signed by OCTAVIO Osorio  [unfilled] 10:25 EDT

## 2024-11-12 ENCOUNTER — TELEMEDICINE (OUTPATIENT)
Dept: PSYCHIATRY | Facility: CLINIC | Age: 24
End: 2024-11-12
Payer: COMMERCIAL

## 2024-11-12 DIAGNOSIS — F90.0 ATTENTION DEFICIT HYPERACTIVITY DISORDER (ADHD), PREDOMINANTLY INATTENTIVE TYPE: Primary | ICD-10-CM

## 2024-11-12 NOTE — PROGRESS NOTES
Video Visit      Patient Name: Umer Friedman  : 2000   MRN: [id number]     Referring Provider: Karoline Zamudio    Chief Complaint:      ICD-10-CM ICD-9-CM   1. Attention deficit hyperactivity disorder (ADHD), predominantly inattentive type  F90.0 314.00        This provider is located at the [facility] (through [facility]), [address], [address], [address], [place] [address] using a secure [employer] Video Visit through [employer]. Patient is being seen remotely via telehealth video visit at their home address in [place], and stated they are in a secure environment for this session. The patient's condition being diagnosed/treated is appropriate for telemedicine. The provider identified herself as well as her credentials. The patient, and/or patients guardian, consent to be seen remotely, and when consent is given they understand that the consent allows for patient identifiable information to be sent to a third party as needed. They may refuse to be seen remotely at any time. The electronic data is encrypted and password protected, and the patient and/or guardian has been advised of the potential risks to privacy not withstanding such measures.    The patient has chosen to receive care today through a telehealth video visit. Do you consent to use a video/audio connection for your medical care today? Yes    History of Present Illness:   Umer Friedman is a 24 y.o. male who is being seen by a video visit today for medication symptom management.  Patient states he is doing well but he had just recently started his new prescription of Adderall 15 mg twice daily 3 days ago on 2024.  Patient stated that he finished taking his Adderall 15 mg XL daily and then forgot about picking up his new prescription of medication until he realized he was having significant difficulty with concentrating and focusing and then he ended up picking up his medication.  He states that he began  taking his new medicine on the ninth but states he has only taken it of the morning and does not take that second dose.  He states that the morning dose of the immediate release seems to last him enough time to study and do his homework and do the things that he needs to concentrate and focus to do.  He also states he is sleeping well with no issues and denies any anxiety and depression at this time aside from normal daily life anxiety per his reports.  He also denies any side effects or adverse reactions to his medication.  Patient is currently at the doctors with his grandmother who was diagnosed with lung cancer attempting to get a second opinion.  This has caused patient some stress.  However, patient states he knows this is nothing medication can take care of.  Due to patient only taking his medication 1 time a day, patient's prescriptions should last him 2 months and patient also states he does not take it every day.  Due to this patient will not follow up with this provider for 3 months unless he has any new or worsening symptoms and then he was instructed to notify this provider.  He was instructed with any thoughts of self-harm or suicidal ideations to go directly to the emergency room.  He denies any recent or current SI/HI/AVH.  He denies any other issues at this time.    Subjective      Review of Systems:   Review of Systems   Psychiatric/Behavioral:  Positive for decreased concentration.        Screening Scores:   PHQ-9 Total Score:    SHAEMKA-7         RISK ASSESSMENT:  Patient denies any thoughts of suicide or intent today. Patient denies any suicidal or homicidal ideation today. Patient denies any high risk factors today.     Medications:   No current outpatient medications on file.    Medication Considerations:  MITCH reviewed and appropriate.      Allergies:   No Known Allergies    Objective     Physical Exam:  Vital Signs: There were no vitals filed for this visit.  There is no height or weight on  file to calculate BMI.     Mental Status Exam:   MENTAL STATUS EXAM   General Appearance:  Cleanly groomed and dressed  Eye Contact:  Good eye contact  Attitude:  Cooperative  Muscle Strength:  Normal  Speech:  Normal rate, tone, volume  Language:  Spontaneous  Mood and affect:  Normal, pleasant  Hopelessness:  Denies  Loneliness: Denies  Thought Process:  Logical  Associations/ Thought Content:  No delusions  Hallucinations:  None  Suicidal Ideations:  Not present  Homicidal Ideation:  Not present  Sensorium:  Alert  Orientation:  Place, person, time and situation  Immediate Recall, Recent, and Remote Memory:  Intact  Attention Span/ Concentration:  Easily distracted  Fund of Knowledge:  Appropriate for age and educational level  Intellectual Functioning:  Average range  Insight:  Good  Judgement:  Fair  Reliability:  Good  Impulse Control:  Good         Assessment / Plan      Visit Diagnosis/Orders Placed This Visit:  Diagnoses and all orders for this visit:    1. Attention deficit hyperactivity disorder (ADHD), predominantly inattentive type (Primary)         Functional Status: Mild impairment    Prognosis: Good with ongoing treatment    Impression/Formulation:  Patient appeared alert and oriented.  Patient is voluntarily requesting to begin outpatient therapy at [facility].  Patient is receptive to assistance with maintaining a stable lifestyle.  Patient presents with history of     ICD-10-CM ICD-9-CM   1. Attention deficit hyperactivity disorder (ADHD), predominantly inattentive type  F90.0 314.00   .     Treatment Plan:   -Continue Adderall 15 mg 1-2 times daily, not due for a refill at this time  -Follow-up in 3 months or sooner if needed      The MITCH report, reviewed through PDMP, of the past 12 months were reviewed and is appropriate.  The patient/guardian reports taking the medication only as prescribed.  The patient/guardian denies any abuse or misuse of the medication.  The  patient/guardian denies any other substance use or issues.  There are no apparent substance related issues.  The patient reports no side effects of the current medication usage.  The patient/guardian has reported significant improvement with medication usage and wishes to continue medication as prescribed.  The patient/guardian is appropriate to continue with current medication usage at this time.  Reinforced risks and side effects of medication usage, patient and/or guardian verbalize understanding in their own words and are in agreement with current plan.    The patient is being prescribed a controlled substance as part of the treatment plan. The patient/guardian has been educated of appropriate use of the medication(s), including risks and side effects such as insomnia, headache, exacerbation of tics, nervousness, irritability, overstimulation, tremor, dizziness, anorexia or change in appetite, nausea, dry mouth, constipation, diarrhea, weight loss, sexual dysfunction, psychotic episodes, seizures, palpitations, tachycardia, hypertension, rare activation (activation of hypomania, kimberly, and/or suicidal ideations), cardiovascular adverse effects including sudden death (especially patients with pre-existing structural abnormalities often associated with a family history of cardiac disease), may slow normal growth in children, weight gain is reported but not expected, sedation is possible but activation is much more common, metabolic adversities, and overdose among others. Patient/guardian is also informed that the medication is to be used by the patient only, the medication is to be used only as directed, and the medication should not be combined with other substances unless directed by a Provider/Prescriber. The patient/guardian verbalized understanding and agreement with this in their own words, and wish to continue with current treatment plan.    GOALS:  Short Term Goals: Patient will be compliant with  medication, and patient will have no significant medication related side effects.  Patient will be engaged in psychotherapy as indicated.  Patient will report subjective improvement of symptoms.  Long term goals: To stabilize mood and treat/improve subjective symptoms, the patient will stay out of the hospital, the patient will be at an optimal level of functioning, and the patient will take all medications as prescribed.  The patient/guardian verbalized understanding and agreement with goals that were mutually set.     Patient will continue supportive psychotherapy efforts and medications as indicated. Clinic will obtain release of information for current treatment team for continuity of care as needed. Patient will contact this office, call 911 or present to the nearest emergency room should suicidal or homicidal ideations occur. Discussed medication options and treatment plan of prescribed medication(s) as well as the risks, benefits, and potential side effects. Patient ackowledged and verbally consented to continue with current treatment plan and was educated on the importance of compliance with treatment and follow-up appointments.     Follow Up:   Return in about 3 months (around 2/12/2025) for Med Check.      OCTAVIO Osorio  [facility]     This is electronically signed by OCTAVIO Osorio  11/12/2024 13:28 EST    Part of this note may be an electronic transcription/translation of spoken language to printed text using the Dragon Dictation System.